# Patient Record
Sex: FEMALE | Race: WHITE | NOT HISPANIC OR LATINO
[De-identification: names, ages, dates, MRNs, and addresses within clinical notes are randomized per-mention and may not be internally consistent; named-entity substitution may affect disease eponyms.]

---

## 2021-03-19 PROBLEM — Z00.00 ENCOUNTER FOR PREVENTIVE HEALTH EXAMINATION: Status: ACTIVE | Noted: 2021-03-19

## 2021-03-22 ENCOUNTER — APPOINTMENT (OUTPATIENT)
Dept: RHEUMATOLOGY | Facility: CLINIC | Age: 36
End: 2021-03-22
Payer: COMMERCIAL

## 2021-03-22 ENCOUNTER — RESULT REVIEW (OUTPATIENT)
Age: 36
End: 2021-03-22

## 2021-03-22 VITALS
TEMPERATURE: 97.9 F | SYSTOLIC BLOOD PRESSURE: 122 MMHG | OXYGEN SATURATION: 98 % | WEIGHT: 185 LBS | BODY MASS INDEX: 29.03 KG/M2 | DIASTOLIC BLOOD PRESSURE: 72 MMHG | HEART RATE: 78 BPM | HEIGHT: 67 IN

## 2021-03-22 DIAGNOSIS — M54.2 CERVICALGIA: ICD-10-CM

## 2021-03-22 DIAGNOSIS — Z82.49 FAMILY HISTORY OF ISCHEMIC HEART DISEASE AND OTHER DISEASES OF THE CIRCULATORY SYSTEM: ICD-10-CM

## 2021-03-22 DIAGNOSIS — Z78.9 OTHER SPECIFIED HEALTH STATUS: ICD-10-CM

## 2021-03-22 DIAGNOSIS — R76.8 OTHER SPECIFIED ABNORMAL IMMUNOLOGICAL FINDINGS IN SERUM: ICD-10-CM

## 2021-03-22 DIAGNOSIS — Z83.3 FAMILY HISTORY OF DIABETES MELLITUS: ICD-10-CM

## 2021-03-22 DIAGNOSIS — M25.50 PAIN IN UNSPECIFIED JOINT: ICD-10-CM

## 2021-03-22 DIAGNOSIS — M54.5 LOW BACK PAIN: ICD-10-CM

## 2021-03-22 DIAGNOSIS — M25.60 STIFFNESS OF UNSPECIFIED JOINT, NOT ELSEWHERE CLASSIFIED: ICD-10-CM

## 2021-03-22 DIAGNOSIS — M25.569 PAIN IN UNSPECIFIED KNEE: ICD-10-CM

## 2021-03-22 PROCEDURE — 99072 ADDL SUPL MATRL&STAF TM PHE: CPT

## 2021-03-22 PROCEDURE — 36415 COLL VENOUS BLD VENIPUNCTURE: CPT

## 2021-03-22 PROCEDURE — 99205 OFFICE O/P NEW HI 60 MIN: CPT | Mod: 25

## 2021-03-22 NOTE — ASSESSMENT
[FreeTextEntry1] : 35-year-old female, here for the first time w/ weak +RF= 29 w/ +TRAN 1:160 nuclear, speckled, 1:80 cytoplasmic w/ reports of joint aches in the b/l hands/PIPs w/ morning stiffness of 2-3 hours daily for past 9 months w/ concern for early RA.\par -reviewed labs from PCP [facility] 9/17/2020 w/ weak +RF= 29 w/ +TRAN 1:160 nuclear, speckled, 1:80 cytoplasmic; normal ESR; low vit D \par -labs as below incld ESR, CRP, serologies, screening labs for DMARDs\par -will consider sulfasalazine or HCQ on f/u \par -Referred for xray of b/l hands to evaluate for structural changes, r/o periarticular osteopenia/RA, r/o erosions\par -Advil PRN w/ food helps w/ pain \par \par Cervicalgia: tenderness in c-spine w/ rotation w/ good ROM w/o paresthesias \par -Referred for xray of c-spine to evaluate for structural changes, DDD\par \par Knee pain -Referred for xray of b/l knees to evaluate for structural changes, OA\par -consider steroid injections \par \par LBP: intermittent \par -Referred for xray of LS spine to evaluate for structural changes, DDD, confirm SI normal \par -Advil PRN w/ food needed sparingly helps\par \par -educated on symptoms to monitor for in detail and alert us if any concerns.\par -knows to stay up to date on health maintenance w/ PCP\par -f/u in 10-14days w/ labs, xrays please\par

## 2021-03-22 NOTE — PHYSICAL EXAM
[General Appearance - Alert] : alert [General Appearance - Well Nourished] : well nourished [Sclera] : the sclera and conjunctiva were normal [Extraocular Movements] : extraocular movements were intact [Outer Ear] : the ears and nose were normal in appearance [Neck Appearance] : the appearance of the neck was normal [Respiration, Rhythm And Depth] : normal respiratory rhythm and effort [Heart Rate And Rhythm] : heart rate was normal and rhythm regular [Heart Sounds] : normal S1 and S2 [Abdomen Soft] : soft [Abdomen Tenderness] : non-tender [Cervical Lymph Nodes Enlarged Anterior Bilaterally] : anterior cervical [Supraclavicular Lymph Nodes Enlarged Bilaterally] : supraclavicular [No CVA Tenderness] : no ~M costovertebral angle tenderness [Motor Tone] : muscle strength and tone were normal [] : no rash [No Focal Deficits] : no focal deficits [Impaired Insight] : insight and judgment were intact [Mood] : the mood was normal [FreeTextEntry1] : tenderness in Rt hand 2-4PIP; tenderness in Lt hand 4th PIP; crepitus in knee w/ Rt>Lt knee tenderness w/o erythema or warmth

## 2021-03-24 LAB
25(OH)D3 SERPL-MCNC: 27.3 NG/ML
ACE BLD-CCNC: 13 U/L
ALBUMIN SERPL ELPH-MCNC: 4.4 G/DL
ALP BLD-CCNC: 70 U/L
ALT SERPL-CCNC: 17 U/L
ANION GAP SERPL CALC-SCNC: 11 MMOL/L
AST SERPL-CCNC: 15 U/L
BASOPHILS # BLD AUTO: 0.03 K/UL
BASOPHILS NFR BLD AUTO: 0.5 %
BILIRUB SERPL-MCNC: <0.2 MG/DL
BUN SERPL-MCNC: 7 MG/DL
C3 SERPL-MCNC: 120 MG/DL
C4 SERPL-MCNC: 34 MG/DL
CALCIUM SERPL-MCNC: 9.6 MG/DL
CCP AB SER IA-ACNC: <8 UNITS
CHLORIDE SERPL-SCNC: 105 MMOL/L
CK SERPL-CCNC: 53 U/L
CO2 SERPL-SCNC: 24 MMOL/L
CREAT SERPL-MCNC: 0.55 MG/DL
CREAT SPEC-SCNC: 142 MG/DL
CREAT/PROT UR: 0.1 RATIO
CRP SERPL-MCNC: <3 MG/L
DSDNA AB SER-ACNC: <12 IU/ML
ENA RNP AB SER IA-ACNC: <0.2 AL
ENA SM AB SER IA-ACNC: <0.2 AL
ENA SS-A AB SER IA-ACNC: <0.2 AL
ENA SS-B AB SER IA-ACNC: <0.2 AL
EOSINOPHIL # BLD AUTO: 0.28 K/UL
EOSINOPHIL NFR BLD AUTO: 4.5 %
ERYTHROCYTE [SEDIMENTATION RATE] IN BLOOD BY WESTERGREN METHOD: 23 MM/HR
GLUCOSE SERPL-MCNC: 93 MG/DL
HAV IGM SER QL: NONREACTIVE
HBV CORE IGM SER QL: NONREACTIVE
HBV SURFACE AG SER QL: NONREACTIVE
HCT VFR BLD CALC: 40.6 %
HCV AB SER QL: NONREACTIVE
HCV S/CO RATIO: 0.09 S/CO
HGB BLD-MCNC: 13.1 G/DL
IMM GRANULOCYTES NFR BLD AUTO: 0.2 %
LYMPHOCYTES # BLD AUTO: 2.08 K/UL
LYMPHOCYTES NFR BLD AUTO: 33.5 %
MAN DIFF?: NORMAL
MCHC RBC-ENTMCNC: 29.5 PG
MCHC RBC-ENTMCNC: 32.3 GM/DL
MCV RBC AUTO: 91.4 FL
MONOCYTES # BLD AUTO: 0.47 K/UL
MONOCYTES NFR BLD AUTO: 7.6 %
NEUTROPHILS # BLD AUTO: 3.33 K/UL
NEUTROPHILS NFR BLD AUTO: 53.7 %
PLATELET # BLD AUTO: 319 K/UL
POTASSIUM SERPL-SCNC: 4 MMOL/L
PROT SERPL-MCNC: 7.1 G/DL
PROT UR-MCNC: 15 MG/DL
RBC # BLD: 4.44 M/UL
RBC # FLD: 13.3 %
RF+CCP IGG SER-IMP: NEGATIVE
RHEUMATOID FACT SER QL: 19 IU/ML
SODIUM SERPL-SCNC: 140 MMOL/L
THYROGLOB AB SERPL-ACNC: <20 IU/ML
THYROPEROXIDASE AB SERPL IA-ACNC: 20.1 IU/ML
TSH SERPL-ACNC: 1.47 UIU/ML
WBC # FLD AUTO: 6.2 K/UL

## 2021-03-26 LAB
ANA PAT FLD IF-IMP: ABNORMAL
ANA SER IF-ACNC: ABNORMAL

## 2021-03-29 ENCOUNTER — APPOINTMENT (OUTPATIENT)
Dept: RADIOLOGY | Facility: CLINIC | Age: 36
End: 2021-03-29
Payer: COMMERCIAL

## 2021-03-29 ENCOUNTER — OUTPATIENT (OUTPATIENT)
Dept: OUTPATIENT SERVICES | Facility: HOSPITAL | Age: 36
LOS: 1 days | End: 2021-03-29
Payer: COMMERCIAL

## 2021-03-29 DIAGNOSIS — M54.2 CERVICALGIA: ICD-10-CM

## 2021-03-29 DIAGNOSIS — M19.049 PRIMARY OSTEOARTHRITIS, UNSPECIFIED HAND: ICD-10-CM

## 2021-03-29 DIAGNOSIS — M25.569 PAIN IN UNSPECIFIED KNEE: ICD-10-CM

## 2021-03-29 LAB
B19V IGG SER QL IA: 7.88 INDEX
B19V IGG+IGM SER-IMP: NORMAL
B19V IGG+IGM SER-IMP: POSITIVE
B19V IGM FLD-ACNC: 0.19 INDEX
B19V IGM SER-ACNC: NEGATIVE
G6PD SER-CCNC: 16 U/G HGB
HLA-B27 RELATED AG QL: NEGATIVE

## 2021-03-29 PROCEDURE — 73120 X-RAY EXAM OF HAND: CPT | Mod: 26,50

## 2021-03-29 PROCEDURE — 72040 X-RAY EXAM NECK SPINE 2-3 VW: CPT | Mod: 26

## 2021-03-29 PROCEDURE — 72110 X-RAY EXAM L-2 SPINE 4/>VWS: CPT | Mod: 26

## 2021-03-29 PROCEDURE — 72110 X-RAY EXAM L-2 SPINE 4/>VWS: CPT

## 2021-03-29 PROCEDURE — 73565 X-RAY EXAM OF KNEES: CPT | Mod: 26

## 2021-03-29 PROCEDURE — 73120 X-RAY EXAM OF HAND: CPT

## 2021-03-29 PROCEDURE — 72040 X-RAY EXAM NECK SPINE 2-3 VW: CPT

## 2021-03-29 PROCEDURE — 73565 X-RAY EXAM OF KNEES: CPT

## 2021-04-28 ENCOUNTER — APPOINTMENT (OUTPATIENT)
Dept: RHEUMATOLOGY | Facility: CLINIC | Age: 36
End: 2021-04-28
Payer: COMMERCIAL

## 2021-04-28 ENCOUNTER — NON-APPOINTMENT (OUTPATIENT)
Age: 36
End: 2021-04-28

## 2021-04-28 VITALS
TEMPERATURE: 98 F | BODY MASS INDEX: 28.98 KG/M2 | DIASTOLIC BLOOD PRESSURE: 72 MMHG | HEART RATE: 100 BPM | WEIGHT: 185 LBS | OXYGEN SATURATION: 97 % | SYSTOLIC BLOOD PRESSURE: 122 MMHG

## 2021-04-28 DIAGNOSIS — M62.830 MUSCLE SPASM OF BACK: ICD-10-CM

## 2021-04-28 DIAGNOSIS — M53.2X2 SPINAL INSTABILITIES, CERVICAL REGION: ICD-10-CM

## 2021-04-28 DIAGNOSIS — M43.10 SPONDYLOLISTHESIS, SITE UNSPECIFIED: ICD-10-CM

## 2021-04-28 DIAGNOSIS — M19.049 PRIMARY OSTEOARTHRITIS, UNSPECIFIED HAND: ICD-10-CM

## 2021-04-28 PROCEDURE — 99072 ADDL SUPL MATRL&STAF TM PHE: CPT

## 2021-04-28 PROCEDURE — 99214 OFFICE O/P EST MOD 30 MIN: CPT

## 2021-04-28 NOTE — ASSESSMENT
[FreeTextEntry1] : 35-year-old female, here for the first follow up w/ +RF= 29 w/ +TRAN 1:160 nuclear, speckled, 1:80 cytoplasmic w/ reports of joint aches in the b/l hands/PIPs w/ morning stiffness of 2-3 hours daily for > 10 months w/ raised ESR w/ concern for early RA.\par -reports daily pain in b/l hands in the PIPs w/ prolonged morning stiffness\par -reviewed labs 3/22/2021 w/ raised ESR= 23; +RF= 19; +TRAN 1:160 speckled; G6PD normal, other serologies normal at this time \par -discussed r/b/s of Plaquenil 200mg PO BID w/ G6 PD normal w/ pt agreeable and prescription sent as below\par -optho referral provided for retinal screening on Plaquenil as discussed\par -labs from PCP [facility] 9/17/2020 w/ weak +RF= 29 w/ +TRAN 1:160 nuclear, speckled, 1:80 cytoplasmic; normal ESR; low vit D \par -reviewed xray of b/l hands 3/29/2021 normal\par -reviewed xray b/l knees 3/29/2021 normal \par -reviewed xray LS spine 3/29/2021 normal; SI normal \par -Advil PRN w/ food helps w/ pain \par \par +TRAN 1:160 speckled: borderline +; likely in the setting of RA\par -Clinically without much symptoms of CTD/lupus/sjogren syndrome at this time and educated on symptoms to monitor for in detail if she evolves.\par -lab as below w/ disease activity markers normal 3/22/2021 w/ DS-DNA normal; C3/C4 WNL; urine prot/creat ratio WNL\par -thyroid abs negative as discussed +TRAN can be seen with cross reactivity\par \par Cervicalgia: tenderness in c-spine w/ rotation w/ good ROM w/o paresthesias \par -Reviewed xray c-spine 3/29/2021 w/ retrolisthesis w/ degree of dynamic instability; straightened lordosis\par -referred for MRI c-spine to r/o ligament injury; confirm no RA pannus for neck pain \par paraspinal tightness/spams: -discussed r/b/s of cyclobenzaprine PRN w/ pt agreeable and prescription sent as below \par \par Hypovitaminosis D: low vitD w/ prescription for vitD repletion sent as discussed.\par \par -educated on symptoms to monitor for in detail and alert us if any concerns.\par -knows to stay up to date on health maintenance w/ PCP\par -f/u in 6-8 weeks w/ labs and MRI c-spine or sooner as needed \par

## 2021-04-28 NOTE — REVIEW OF SYSTEMS
[As Noted in HPI] : as noted in HPI [Fever] : no fever [Chills] : no chills [Eye Pain] : no eye pain [Red Eyes] : eyes not red [Nosebleeds] : no nosebleeds [Chest Pain] : no chest pain [Shortness Of Breath] : no shortness of breath [Abdominal Pain] : no abdominal pain [Skin Lesions] : no skin lesions [Confused] : no confusion [Suicidal] : not suicidal [Proptosis] : no proptosis [Easy Bleeding] : no tendency for easy bleeding

## 2021-04-28 NOTE — HISTORY OF PRESENT ILLNESS
[FreeTextEntry1] : 35-year-old female here for the first follow to review labs/xrays. Patient was found to have positive rheumatoid factor as well as positive TRAN with her primary care.\par Patient states that she does notice achy joint aches since around summer, July of 2020. Patient states she can notice daily joint aches in her bilateral hands specially around the PIPs.\par States morning stiffness in the hands is about 2-3 hours. States she thinks there can be some swelling w/ the pain.\par States taking Advil helps the pain.\par States she notices intermittent soreness in her elbows at times.\par States she notices some intermittent right more than left knee pain with stiffness. Denies any crystal arthropathy like attacks.\par States she did notices some soreness in her neck with rotation without paresthesias.\par States she notices lower back pain worse with standing; better with stretching. Denies any loss of bladder or bowel incontinence or saddle anesthesias.\par Denies any pain or swelling in her ankles or her toes at this time.\par Denies any fever/chills, no rashes, no nasal or oral ulcers, no dry eyes, no dry mouth, no raynaud's, no infectious diarrhea or  symptoms at this time. States she has hx of UTI in the past and now now.\par Denies any hx of blood clots, no stroke, no pregnancies, no miscarriages. \par \par

## 2021-06-16 LAB
ALBUMIN SERPL ELPH-MCNC: 4.4 G/DL
ALP BLD-CCNC: 65 U/L
ALT SERPL-CCNC: 14 U/L
ANION GAP SERPL CALC-SCNC: 12 MMOL/L
AST SERPL-CCNC: 13 U/L
BASOPHILS # BLD AUTO: 0.03 K/UL
BASOPHILS NFR BLD AUTO: 0.5 %
BILIRUB SERPL-MCNC: 0.4 MG/DL
BUN SERPL-MCNC: 9 MG/DL
CALCIUM SERPL-MCNC: 9.7 MG/DL
CHLORIDE SERPL-SCNC: 102 MMOL/L
CO2 SERPL-SCNC: 25 MMOL/L
CREAT SERPL-MCNC: 0.63 MG/DL
CREAT SPEC-SCNC: 245 MG/DL
CREAT/PROT UR: 0.1 RATIO
CRP SERPL-MCNC: <3 MG/L
EOSINOPHIL # BLD AUTO: 0.21 K/UL
EOSINOPHIL NFR BLD AUTO: 3.3 %
GLUCOSE SERPL-MCNC: 166 MG/DL
HCT VFR BLD CALC: 39.3 %
HGB BLD-MCNC: 13.1 G/DL
IMM GRANULOCYTES NFR BLD AUTO: 0.2 %
LYMPHOCYTES # BLD AUTO: 1.76 K/UL
LYMPHOCYTES NFR BLD AUTO: 27.3 %
MAN DIFF?: NORMAL
MCHC RBC-ENTMCNC: 30 PG
MCHC RBC-ENTMCNC: 33.3 GM/DL
MCV RBC AUTO: 90.1 FL
MONOCYTES # BLD AUTO: 0.4 K/UL
MONOCYTES NFR BLD AUTO: 6.2 %
NEUTROPHILS # BLD AUTO: 4.04 K/UL
NEUTROPHILS NFR BLD AUTO: 62.5 %
PLATELET # BLD AUTO: 308 K/UL
POTASSIUM SERPL-SCNC: 4 MMOL/L
PROT SERPL-MCNC: 7.3 G/DL
PROT UR-MCNC: 14 MG/DL
RBC # BLD: 4.36 M/UL
RBC # FLD: 13.3 %
SODIUM SERPL-SCNC: 138 MMOL/L
WBC # FLD AUTO: 6.45 K/UL

## 2021-06-17 ENCOUNTER — OUTPATIENT (OUTPATIENT)
Dept: OUTPATIENT SERVICES | Facility: HOSPITAL | Age: 36
LOS: 1 days | End: 2021-06-17
Payer: COMMERCIAL

## 2021-06-17 ENCOUNTER — APPOINTMENT (OUTPATIENT)
Dept: MRI IMAGING | Facility: CLINIC | Age: 36
End: 2021-06-17
Payer: COMMERCIAL

## 2021-06-17 DIAGNOSIS — Z00.8 ENCOUNTER FOR OTHER GENERAL EXAMINATION: ICD-10-CM

## 2021-06-17 DIAGNOSIS — M53.2X2 SPINAL INSTABILITIES, CERVICAL REGION: ICD-10-CM

## 2021-06-17 LAB
ANA PAT FLD IF-IMP: ABNORMAL
ANA SER IF-ACNC: ABNORMAL
C3 SERPL-MCNC: 114 MG/DL
C4 SERPL-MCNC: 29 MG/DL
ERYTHROCYTE [SEDIMENTATION RATE] IN BLOOD BY WESTERGREN METHOD: 32 MM/HR

## 2021-06-17 PROCEDURE — 72156 MRI NECK SPINE W/O & W/DYE: CPT | Mod: 26

## 2021-06-17 PROCEDURE — 72156 MRI NECK SPINE W/O & W/DYE: CPT

## 2021-06-17 PROCEDURE — A9585: CPT

## 2021-06-21 LAB — DSDNA AB SER-ACNC: <12 IU/ML

## 2021-06-24 ENCOUNTER — APPOINTMENT (OUTPATIENT)
Dept: RHEUMATOLOGY | Facility: CLINIC | Age: 36
End: 2021-06-24
Payer: COMMERCIAL

## 2021-06-24 VITALS
TEMPERATURE: 98 F | WEIGHT: 185 LBS | OXYGEN SATURATION: 98 % | SYSTOLIC BLOOD PRESSURE: 100 MMHG | HEART RATE: 74 BPM | BODY MASS INDEX: 29.03 KG/M2 | HEIGHT: 67 IN | DIASTOLIC BLOOD PRESSURE: 60 MMHG

## 2021-06-24 PROCEDURE — 99214 OFFICE O/P EST MOD 30 MIN: CPT

## 2021-06-24 PROCEDURE — 99072 ADDL SUPL MATRL&STAF TM PHE: CPT

## 2021-06-24 RX ORDER — ERGOCALCIFEROL 1.25 MG/1
1.25 MG CAPSULE, LIQUID FILLED ORAL
Qty: 4 | Refills: 1 | Status: DISCONTINUED | COMMUNITY
Start: 2021-04-28 | End: 2021-06-24

## 2021-06-24 NOTE — ASSESSMENT
[FreeTextEntry1] : 35-year-old female, here for follow up w/ +RF= 29 w/ +TRAN 1:640 homo; 1:160 nuclear, speckled, 1:80 cytoplasmic w/ reports of joint aches in the b/l hands/PIPs w/ morning stiffness of 2-3 hours daily w/ raised ESR w/ concern for RA.\par -reports improved pain in the b/l hands on HCQ and notes intermittent stiffness in the PIPs at times\par -discussed will get Vectra for f/u to evaluate disease activity and if not low will add on DMARDs then\par -reviewed labs 6/16/2021 w/ normal CRP; raised ESR= 32; +TRAN 1:640 homo; DS-DNA neg; C3/C4 WNL; urine prot/creat ratio=0.1\par -refill Plaquenil 200mg PO BID w/ G6PD normal w/ pt agreeable and prescription sent as below\par -optho referral provided for retinal screening on Plaquenil as discussed again \par -labs as below to monitor for f/u \par -xray of b/l hands 3/29/2021 normal\par -xray b/l knees 3/29/2021 normal \par -xray LS spine 3/29/2021 normal; SI normal \par -Advil PRN w/ food helps w/ pain \par \par +TRAN 1:640 homogenous; likely in the setting of RA\par -Clinically without much symptoms of CTD/lupus/sjogren syndrome at this time and educated on symptoms to monitor for in detail if she evolves.\par -lab as below w/ disease activity markers normal 6/16/2021 w/ DS-DNA normal; C3/C4 WNL; urine prot/creat ratio WNL\par -thyroid abs negative \par \par Cervicalgia: tenderness in c-spine w/ rotation w/ good ROM w/o paresthesias \par -reviewed MRI c-spine 6/17/2021 minimal cervical spondylosis \par -xray c-spine 3/29/2021 w/ retrolisthesis w/ degree of dynamic instability; straightened lordosis\par \par -educated on symptoms to monitor for in detail and alert us if any concerns.\par -knows to stay up to date on health maintenance w/ PCP\par -f/u in 10-12 weeks w/ labs, Vectra or sooner as needed \par . \par \par

## 2021-06-24 NOTE — PHYSICAL EXAM
[General Appearance - Alert] : alert [General Appearance - In No Acute Distress] : in no acute distress [Sclera] : the sclera and conjunctiva were normal [Extraocular Movements] : extraocular movements were intact [Outer Ear] : the ears and nose were normal in appearance [Neck Appearance] : the appearance of the neck was normal [Respiration, Rhythm And Depth] : normal respiratory rhythm and effort [Heart Rate And Rhythm] : heart rate was normal and rhythm regular [Heart Sounds] : normal S1 and S2 [Abdomen Soft] : soft [Abdomen Tenderness] : non-tender [Cervical Lymph Nodes Enlarged Anterior Bilaterally] : anterior cervical [Supraclavicular Lymph Nodes Enlarged Bilaterally] : supraclavicular [No CVA Tenderness] : no ~M costovertebral angle tenderness [Motor Tone] : muscle strength and tone were normal [] : no rash [No Focal Deficits] : no focal deficits [Impaired Insight] : insight and judgment were intact [Mood] : the mood was normal [FreeTextEntry1] : no synovitis or effusion on exam noted today; good ROM in b/l shoulders

## 2021-06-24 NOTE — HISTORY OF PRESENT ILLNESS
[FreeTextEntry1] : 35-year-old female, here for follow up w/ +RF= 29 w/ +TRAN 1:640 homo; 1:160 nuclear, speckled, 1:80 cytoplasmic w/ reports of joint aches in the b/l hands/PIPs w/ morning stiffness of 2-3 hours daily w/ raised ESR w/ concern for RA.\par \par Patient states her joints aches today on the HCQ are much improved. States she still has to see Optho for eye exam on HCQ and will go soon. States no swelling or warmth of the joints today. States she notes intermittent stiffness in the hands around the PIPs for about 30 mins and monitoring for any swelling. \par States she did notices some soreness in her neck with rotation without paresthesias & did MRI to review.\par Denies any pain or swelling in her ankles or her toes at this time.\par Denies any fever/chills, no rashes, no nasal or oral ulcers, no dry eyes, no dry mouth, no raynaud's, no infectious diarrhea or  symptoms at this time. States she has hx of UTI in the past and now now.\par Denies any hx of blood clots, no stroke, no pregnancies, no miscarriages. \par \par \par

## 2021-09-23 ENCOUNTER — LABORATORY RESULT (OUTPATIENT)
Age: 36
End: 2021-09-23

## 2021-09-27 ENCOUNTER — LABORATORY RESULT (OUTPATIENT)
Age: 36
End: 2021-09-27

## 2021-09-27 ENCOUNTER — NON-APPOINTMENT (OUTPATIENT)
Age: 36
End: 2021-09-27

## 2021-09-27 ENCOUNTER — APPOINTMENT (OUTPATIENT)
Dept: FAMILY MEDICINE | Facility: CLINIC | Age: 36
End: 2021-09-27
Payer: COMMERCIAL

## 2021-09-27 VITALS
HEIGHT: 67 IN | SYSTOLIC BLOOD PRESSURE: 110 MMHG | WEIGHT: 199 LBS | HEART RATE: 81 BPM | OXYGEN SATURATION: 98 % | DIASTOLIC BLOOD PRESSURE: 62 MMHG | TEMPERATURE: 96.4 F | BODY MASS INDEX: 31.23 KG/M2

## 2021-09-27 DIAGNOSIS — Z13.6 ENCOUNTER FOR SCREENING FOR CARDIOVASCULAR DISORDERS: ICD-10-CM

## 2021-09-27 DIAGNOSIS — J45.909 UNSPECIFIED ASTHMA, UNCOMPLICATED: ICD-10-CM

## 2021-09-27 DIAGNOSIS — Z00.00 ENCOUNTER FOR GENERAL ADULT MEDICAL EXAMINATION W/OUT ABNORMAL FINDINGS: ICD-10-CM

## 2021-09-27 LAB
25(OH)D3 SERPL-MCNC: 24.1 NG/ML
ALBUMIN SERPL ELPH-MCNC: 4.6 G/DL
ALP BLD-CCNC: 63 U/L
ALT SERPL-CCNC: 13 U/L
ANA PAT FLD IF-IMP: ABNORMAL
ANA SER IF-ACNC: ABNORMAL
ANION GAP SERPL CALC-SCNC: 12 MMOL/L
AST SERPL-CCNC: 15 U/L
BASOPHILS # BLD AUTO: 0.02 K/UL
BASOPHILS NFR BLD AUTO: 0.4 %
BILIRUB SERPL-MCNC: 0.3 MG/DL
BUN SERPL-MCNC: 10 MG/DL
C3 SERPL-MCNC: 130 MG/DL
C4 SERPL-MCNC: 30 MG/DL
CALCIUM SERPL-MCNC: 9.4 MG/DL
CHLORIDE SERPL-SCNC: 103 MMOL/L
CO2 SERPL-SCNC: 23 MMOL/L
CREAT SERPL-MCNC: 0.61 MG/DL
CREAT SPEC-SCNC: 55 MG/DL
CREAT/PROT UR: 0.2 RATIO
CRP SERPL-MCNC: <3 MG/L
DSDNA AB SER-ACNC: <12 IU/ML
EOSINOPHIL # BLD AUTO: 0.15 K/UL
EOSINOPHIL NFR BLD AUTO: 2.8 %
ERYTHROCYTE [SEDIMENTATION RATE] IN BLOOD BY WESTERGREN METHOD: 27 MM/HR
GLUCOSE SERPL-MCNC: 108 MG/DL
HCT VFR BLD CALC: 43.7 %
HGB BLD-MCNC: 13.9 G/DL
IMM GRANULOCYTES NFR BLD AUTO: 0.2 %
LYMPHOCYTES # BLD AUTO: 1.82 K/UL
LYMPHOCYTES NFR BLD AUTO: 34.1 %
MAN DIFF?: NORMAL
MCHC RBC-ENTMCNC: 29.3 PG
MCHC RBC-ENTMCNC: 31.8 GM/DL
MCV RBC AUTO: 92 FL
MONOCYTES # BLD AUTO: 0.34 K/UL
MONOCYTES NFR BLD AUTO: 6.4 %
NEUTROPHILS # BLD AUTO: 3 K/UL
NEUTROPHILS NFR BLD AUTO: 56.1 %
PLATELET # BLD AUTO: 305 K/UL
POTASSIUM SERPL-SCNC: 4.3 MMOL/L
PROT SERPL-MCNC: 7.4 G/DL
PROT UR-MCNC: 11 MG/DL
RBC # BLD: 4.75 M/UL
RBC # FLD: 13.9 %
SODIUM SERPL-SCNC: 138 MMOL/L
WBC # FLD AUTO: 5.34 K/UL

## 2021-09-27 PROCEDURE — 36415 COLL VENOUS BLD VENIPUNCTURE: CPT

## 2021-09-27 PROCEDURE — 99395 PREV VISIT EST AGE 18-39: CPT | Mod: 25

## 2021-09-27 PROCEDURE — 93000 ELECTROCARDIOGRAM COMPLETE: CPT

## 2021-09-27 RX ORDER — ALBUTEROL SULFATE 90 UG/1
108 (90 BASE) INHALANT RESPIRATORY (INHALATION)
Qty: 1 | Refills: 6 | Status: ACTIVE | COMMUNITY
Start: 1900-01-01 | End: 1900-01-01

## 2021-09-27 NOTE — PLAN
[FreeTextEntry1] : Fasting labs were performed today . ECG reviewed and WNL \par Advised to undergo any preventative testing that is overdue . \par Patient will continue healthy eating and exercise and followup in one year for PE\par

## 2021-09-27 NOTE — HISTORY OF PRESENT ILLNESS
[de-identified] : Patient is here for yearly physical. She was recently diagnosed with RA after evaluation at Rheumatology , she currently is feeling improved since starting Plaquenil  . She is UTD with all preventative testing , dentist, Optho and Derm .\par Patient is eating healthy  and exercising . Patient is Not Covid Vaccinated. She was diagnosed with Covid 4/2021 and is requesting antibody test today \par Patient is fasting for physical labs today\par

## 2021-09-27 NOTE — HEALTH RISK ASSESSMENT
[Very Good] : ~his/her~  mood as very good [Yes] : Yes [Monthly or less (1 pt)] : Monthly or less (1 point) [1 or 2 (0 pts)] : 1 or 2 (0 points) [Never (0 pts)] : Never (0 points) [0] : 2) Feeling down, depressed, or hopeless: Not at all (0) [Patient reported PAP Smear was normal] : Patient reported PAP Smear was normal [Alone] : lives alone [Employed] : employed [College] : College [Single] : single [Reports normal functional visual acuity (ie: able to read med bottle)] : Reports normal functional visual acuity [Smoke Detector] : smoke detector [Carbon Monoxide Detector] : carbon monoxide detector [Safety elements used in home] : safety elements used in home [Seat Belt] :  uses seat belt [Sunscreen] : uses sunscreen [Feels Safe at Home] : Feels safe at home [] : No [de-identified] : walking [de-identified] : moderately healthy  [Sexually Active] : not sexually active [Reports changes in hearing] : Reports no changes in hearing [Reports changes in vision] : Reports no changes in vision [Reports changes in dental health] : Reports no changes in dental health [PapSmearDate] : 2019  [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

## 2021-09-28 LAB
CHOLEST SERPL-MCNC: 191 MG/DL
COVID-19 NUCLEOCAPSID  GAM ANTIBODY INTERPRETATION: POSITIVE
ESTIMATED AVERAGE GLUCOSE: 108 MG/DL
HBA1C MFR BLD HPLC: 5.4 %
HDLC SERPL-MCNC: 42 MG/DL
LDLC SERPL CALC-MCNC: 135 MG/DL
NONHDLC SERPL-MCNC: 149 MG/DL
SARS-COV-2 AB SERPL QL IA: 229 INDEX
T3RU NFR SERPL: 1.1 TBI
TRIGL SERPL-MCNC: 66 MG/DL
TSH SERPL-ACNC: 2.01 UIU/ML

## 2021-09-29 ENCOUNTER — NON-APPOINTMENT (OUTPATIENT)
Age: 36
End: 2021-09-29

## 2021-10-04 ENCOUNTER — APPOINTMENT (OUTPATIENT)
Dept: RHEUMATOLOGY | Facility: CLINIC | Age: 36
End: 2021-10-04
Payer: COMMERCIAL

## 2021-10-04 VITALS
DIASTOLIC BLOOD PRESSURE: 72 MMHG | TEMPERATURE: 96.7 F | BODY MASS INDEX: 29.82 KG/M2 | HEIGHT: 67 IN | WEIGHT: 190 LBS | OXYGEN SATURATION: 98 % | HEART RATE: 80 BPM | SYSTOLIC BLOOD PRESSURE: 103 MMHG

## 2021-10-04 DIAGNOSIS — Z78.9 OTHER SPECIFIED HEALTH STATUS: ICD-10-CM

## 2021-10-04 PROCEDURE — 99214 OFFICE O/P EST MOD 30 MIN: CPT

## 2021-10-04 NOTE — ASSESSMENT
[FreeTextEntry1] : 36-year-old female, here for follow up w/ +RF= 29 w/ +TRAN 1:1280 speckled; 1:640 homo;1:80 cytoplasmic w/ reports of joint aches in the b/l hands/PIPs w/ morning stiffness of 2-3 hours daily w/ raised ESR consistent w/ RA.\par -reports improved pain in the b/l hands on HCQ without synovitis now \par -reviewed labs 9/23/2021 w/ normal CRP; mildly raised ESR= 27; +TRAN 1:1280 speckled; DS-DNA neg; C3/C4 WNL; urine prot/creat ratio=0.1; Vectra pending \par -refill Plaquenil 200mg PO BID w/ G6PD normal w/ pt agreeable and prescription sent as below\par -reports seeing Optho, Dr. Parra w/ [facility] tomorrow for eye exam on HCQ and will alert us if any concerns\par -labs as below to monitor for f/u \par -xray of b/l hands 3/29/2021 normal\par -xray b/l knees 3/29/2021 normal \par -xray LS spine 3/29/2021 normal; SI normal \par -Advil PRN w/ food helps w/ pain \par \par +TRAN 1:1280 speckled: prior TRAN 1:640 homogenous; likely in the setting of RA\par -Clinically without much symptoms of CTD/lupus/sjogren syndrome at this time and educated on symptoms to monitor for in detail if she evolves.\par -lab as below w/ disease activity markers normal 9/23/2021 w/ +TRAN 1:1280 speckled; DS-DNA normal; C3/C4 WNL; urine prot/creat ratio WNL\par -thyroid abs negative \par \par Cervicalgia: tenderness in c-spine w/ rotation w/ good ROM w/o paresthesias \par -MRI c-spine 6/17/2021 minimal cervical spondylosis \par -xray c-spine 3/29/2021 w/ retrolisthesis w/ degree of dynamic instability; straightened lordosis\par \par Hypovitaminosis D: low vitD w/ prescription for vitD repletion sent as discussed.\par \par -educated on symptoms to monitor for in detail and alert us if any concerns.\par -knows to stay up to date on health maintenance w/ PCP\par -f/u in 3-4 mo w/ labs or sooner as needed \par . \par \par  \par

## 2021-10-04 NOTE — HISTORY OF PRESENT ILLNESS
[FreeTextEntry1] : 36-year-old female, here for follow up w/ +RF= 29 w/ +TRAN 1:640 homo; 1:160 nuclear, speckled, 1:80 cytoplasmic w/ reports of joint aches in the b/l hands/PIPs w/ morning stiffness of 2-3 hours daily w/ raised ESR w/ concern for RA.\par \par Patient states her joints aches today on the HCQ are much improved. States she still has to see Optho for eye exam on HCQ and reports scheduled for tomorrow w/ Dr. Parra at [facility] and will alert us if any concerns. States no swelling or warmth of the joints today. \par States she notes intermittent stiffness in the hands around the PIPs for about 15 mins.\par States not much neck pain lately. \par Denies any pain or swelling in her ankles or her toes at this time.\par Denies any fever/chills, no rashes, no nasal or oral ulcers, no dry eyes, no dry mouth, no raynaud's, no infectious diarrhea or  symptoms at this time. States she has hx of UTI in the past and now now.\par Denies any hx of blood clots, no stroke, no pregnancies, no miscarriages. \par \par \par

## 2021-10-04 NOTE — PHYSICAL EXAM
[Impaired Insight] : insight and judgment were intact [Mood] : the mood was normal [General Appearance - Alert] : alert [General Appearance - In No Acute Distress] : in no acute distress [Sclera] : the sclera and conjunctiva were normal [Extraocular Movements] : extraocular movements were intact [Outer Ear] : the ears and nose were normal in appearance [Neck Appearance] : the appearance of the neck was normal [Respiration, Rhythm And Depth] : normal respiratory rhythm and effort [Heart Rate And Rhythm] : heart rate was normal and rhythm regular [Heart Sounds] : normal S1 and S2 [Abdomen Soft] : soft [Abdomen Tenderness] : non-tender [Cervical Lymph Nodes Enlarged Anterior Bilaterally] : anterior cervical [Supraclavicular Lymph Nodes Enlarged Bilaterally] : supraclavicular [No CVA Tenderness] : no ~M costovertebral angle tenderness [Motor Tone] : muscle strength and tone were normal [] : no rash [No Focal Deficits] : no focal deficits [FreeTextEntry1] : no synovitis or effusion on exam noted today

## 2021-10-04 NOTE — REASON FOR VISIT
[Follow-Up: _____] : a [unfilled] follow-up visit [FreeTextEntry1] : RA; review labs/meds; +TRAN \par Denies any hx of blood clots, no stroke, no pregnancies, no miscarriages. \par \par \par

## 2021-10-05 ENCOUNTER — APPOINTMENT (OUTPATIENT)
Dept: OPHTHALMOLOGY | Facility: CLINIC | Age: 36
End: 2021-10-05
Payer: COMMERCIAL

## 2021-10-05 ENCOUNTER — NON-APPOINTMENT (OUTPATIENT)
Age: 36
End: 2021-10-05

## 2021-10-05 PROCEDURE — 92004 COMPRE OPH EXAM NEW PT 1/>: CPT

## 2021-10-05 PROCEDURE — 92083 EXTENDED VISUAL FIELD XM: CPT

## 2021-10-05 PROCEDURE — 92134 CPTRZ OPH DX IMG PST SGM RTA: CPT

## 2021-12-31 LAB
25(OH)D3 SERPL-MCNC: 25.9 NG/ML
ALBUMIN SERPL ELPH-MCNC: 4.4 G/DL
ALP BLD-CCNC: 60 U/L
ALT SERPL-CCNC: 13 U/L
ANION GAP SERPL CALC-SCNC: 12 MMOL/L
AST SERPL-CCNC: 15 U/L
BASOPHILS # BLD AUTO: 0.03 K/UL
BASOPHILS NFR BLD AUTO: 0.5 %
BILIRUB SERPL-MCNC: 0.3 MG/DL
BUN SERPL-MCNC: 10 MG/DL
CALCIUM SERPL-MCNC: 9.1 MG/DL
CHLORIDE SERPL-SCNC: 106 MMOL/L
CO2 SERPL-SCNC: 21 MMOL/L
CREAT SERPL-MCNC: 0.6 MG/DL
CREAT SPEC-SCNC: 73 MG/DL
CREAT/PROT UR: 0.1 RATIO
CRP SERPL-MCNC: <3 MG/L
EOSINOPHIL # BLD AUTO: 0.2 K/UL
EOSINOPHIL NFR BLD AUTO: 3.2 %
ERYTHROCYTE [SEDIMENTATION RATE] IN BLOOD BY WESTERGREN METHOD: 26 MM/HR
GLUCOSE SERPL-MCNC: 118 MG/DL
HCT VFR BLD CALC: 40.7 %
HGB BLD-MCNC: 13.4 G/DL
IMM GRANULOCYTES NFR BLD AUTO: 0.3 %
LYMPHOCYTES # BLD AUTO: 2 K/UL
LYMPHOCYTES NFR BLD AUTO: 31.7 %
MAN DIFF?: NORMAL
MCHC RBC-ENTMCNC: 30 PG
MCHC RBC-ENTMCNC: 32.9 GM/DL
MCV RBC AUTO: 91.1 FL
MONOCYTES # BLD AUTO: 0.42 K/UL
MONOCYTES NFR BLD AUTO: 6.7 %
NEUTROPHILS # BLD AUTO: 3.63 K/UL
NEUTROPHILS NFR BLD AUTO: 57.6 %
PLATELET # BLD AUTO: 295 K/UL
POTASSIUM SERPL-SCNC: 4.2 MMOL/L
PROT SERPL-MCNC: 7 G/DL
PROT UR-MCNC: 5 MG/DL
RBC # BLD: 4.47 M/UL
RBC # FLD: 13.5 %
SODIUM SERPL-SCNC: 139 MMOL/L
WBC # FLD AUTO: 6.3 K/UL

## 2022-01-03 LAB
ANA PAT FLD IF-IMP: ABNORMAL
ANA SER IF-ACNC: ABNORMAL
DSDNA AB SER-ACNC: <12 IU/ML

## 2022-01-05 LAB
C3 SERPL-MCNC: 169 MG/DL
C4 SERPL-MCNC: 34 MG/DL

## 2022-01-06 ENCOUNTER — APPOINTMENT (OUTPATIENT)
Dept: RHEUMATOLOGY | Facility: CLINIC | Age: 37
End: 2022-01-06
Payer: COMMERCIAL

## 2022-01-06 VITALS
SYSTOLIC BLOOD PRESSURE: 122 MMHG | DIASTOLIC BLOOD PRESSURE: 72 MMHG | WEIGHT: 190 LBS | TEMPERATURE: 97.9 F | BODY MASS INDEX: 29.76 KG/M2 | OXYGEN SATURATION: 98 % | HEART RATE: 68 BPM

## 2022-01-06 DIAGNOSIS — E55.9 VITAMIN D DEFICIENCY, UNSPECIFIED: ICD-10-CM

## 2022-01-06 PROCEDURE — 99214 OFFICE O/P EST MOD 30 MIN: CPT

## 2022-01-06 RX ORDER — CYCLOBENZAPRINE HYDROCHLORIDE 5 MG/1
5 TABLET, FILM COATED ORAL
Qty: 60 | Refills: 1 | Status: DISCONTINUED | COMMUNITY
Start: 2021-04-28 | End: 2022-01-06

## 2022-01-06 NOTE — ASSESSMENT
[FreeTextEntry1] : 36-year-old female, here for follow up w/ +RF= 29 w/ +TRAN 1:1280 speckled; 1:640 homo;1:80 cytoplasmic w/ reports of joint aches in the b/l hands/PIPs w/ morning stiffness of 2-3 hours daily w/ raised ESR w/ Vectra =41 w/ moderate disease activity consistent w/ RA.\par -reports improved pain in the b/l hands on HCQ without synovitis now \par -reviewed labs 12/30/2021 w/ normal CRP; mildly raised decreased ESR= 26 (from 27); +TRAN 1:640 speckled; DS-DNA neg; C3/C4 WNL; urine prot/creat ratio=0.1\par -refill Plaquenil 200mg PO BID w/ G6PD normal w/ pt agreeable and prescription sent as below\par -saw Optho, Dr. Parra w/ [facility] 10/5/21 w/ eye exam normal on HCQ to f/u in 1 yr \par -labs as below to monitor for f/u \par -xray of b/l hands 3/29/2021 normal\par -xray b/l knees 3/29/2021 normal \par -xray LS spine 3/29/2021 normal; SI normal \par -Advil PRN w/ food helps w/ pain \par \par +TRAN 1:1280 speckled: prior TRAN 1:640 speckled/ homo; likely in the setting of RA\par -Clinically without much symptoms of CTD/lupus/sjogren syndrome at this time and educated on symptoms to monitor for in detail if she evolves.\par -lab as below w/ disease activity markers normal 12/30/2021 w/ +TRAN 1:640 speckled; DS-DNA normal; C3/C4 WNL; urine prot/creat ratio=0.1\par -thyroid abs negative \par \par Cervicalgia: intermittent; not much today \par -MRI c-spine 6/17/2021 minimal cervical spondylosis \par -xray c-spine 3/29/2021 w/ retrolisthesis w/ degree of dynamic instability; straightened lordosis\par \par Hypovitaminosis D: low vitD w/ prescription for vitD repletion sent as discussed.\par \par -educated on symptoms to monitor for in detail and alert us if any concerns.\par -knows to stay up to date on health maintenance w/ PCP\par -f/u in 3 mo w/ labs or sooner as needed \par

## 2022-01-06 NOTE — HISTORY OF PRESENT ILLNESS
[FreeTextEntry1] : 36-year-old female, here for follow up w/ +RF= 29 w/ +TRAN 1:640 homo; 1:160 nuclear, speckled, 1:80 cytoplasmic w/ reports of joint aches in the b/l hands/PIPs w/ morning stiffness of 2-3 hours daily w/ raised ESR w/ concern for RA.\par \par Patient states her joints aches today on the HCQ are much improved. States she still has to saw Optho, Dr. Parra 10/2021 w/ eye exam normal on HCQ and told to f/u in 1 yr. States no swelling or warmth of the joints today. \par States she notes intermittent stiffness in the hands around the PIPs for about 15 mins.\par States not much neck pain lately. \par Denies any pain or swelling in her ankles or her toes at this time.\par Denies any fever/chills, no rashes, no nasal or oral ulcers, no dry eyes, no dry mouth, no raynaud's, no infectious diarrhea or  symptoms at this time. States she has hx of UTI in the past and now now.\par Denies any hx of blood clots, no stroke, no pregnancies, no miscarriages. \par \par \par

## 2022-01-06 NOTE — REASON FOR VISIT
[Follow-Up: _____] : a [unfilled] follow-up visit [FreeTextEntry1] : RA; review labs/meds; +TRAN              \par Denies any hx of blood clots, no stroke, no pregnancies, no miscarriages. \par

## 2022-04-28 LAB
25(OH)D3 SERPL-MCNC: 33.1 NG/ML
ALBUMIN SERPL ELPH-MCNC: 4.6 G/DL
ALP BLD-CCNC: 64 U/L
ALT SERPL-CCNC: 16 U/L
ANION GAP SERPL CALC-SCNC: 12 MMOL/L
AST SERPL-CCNC: 15 U/L
BASOPHILS # BLD AUTO: 0.02 K/UL
BASOPHILS NFR BLD AUTO: 0.3 %
BILIRUB SERPL-MCNC: 0.4 MG/DL
BUN SERPL-MCNC: 9 MG/DL
CALCIUM SERPL-MCNC: 9.5 MG/DL
CHLORIDE SERPL-SCNC: 101 MMOL/L
CO2 SERPL-SCNC: 22 MMOL/L
CREAT SERPL-MCNC: 0.61 MG/DL
CRP SERPL-MCNC: <3 MG/L
EGFR: 120 ML/MIN/1.73M2
EOSINOPHIL # BLD AUTO: 0.11 K/UL
EOSINOPHIL NFR BLD AUTO: 1.7 %
ERYTHROCYTE [SEDIMENTATION RATE] IN BLOOD BY WESTERGREN METHOD: 22 MM/HR
GLUCOSE SERPL-MCNC: 109 MG/DL
HCT VFR BLD CALC: 43 %
HGB BLD-MCNC: 13.9 G/DL
IMM GRANULOCYTES NFR BLD AUTO: 0.2 %
LYMPHOCYTES # BLD AUTO: 1.88 K/UL
LYMPHOCYTES NFR BLD AUTO: 28.8 %
MAN DIFF?: NORMAL
MCHC RBC-ENTMCNC: 29.1 PG
MCHC RBC-ENTMCNC: 32.3 GM/DL
MCV RBC AUTO: 90.1 FL
MONOCYTES # BLD AUTO: 0.49 K/UL
MONOCYTES NFR BLD AUTO: 7.5 %
NEUTROPHILS # BLD AUTO: 4.02 K/UL
NEUTROPHILS NFR BLD AUTO: 61.5 %
PLATELET # BLD AUTO: 319 K/UL
POTASSIUM SERPL-SCNC: 4.4 MMOL/L
PROT SERPL-MCNC: 7.6 G/DL
RBC # BLD: 4.77 M/UL
RBC # FLD: 13.7 %
SODIUM SERPL-SCNC: 136 MMOL/L
WBC # FLD AUTO: 6.53 K/UL

## 2022-05-04 ENCOUNTER — APPOINTMENT (OUTPATIENT)
Dept: RHEUMATOLOGY | Facility: CLINIC | Age: 37
End: 2022-05-04
Payer: COMMERCIAL

## 2022-05-04 VITALS
BODY MASS INDEX: 30.38 KG/M2 | TEMPERATURE: 97.1 F | DIASTOLIC BLOOD PRESSURE: 70 MMHG | OXYGEN SATURATION: 98 % | SYSTOLIC BLOOD PRESSURE: 122 MMHG | WEIGHT: 194 LBS | HEART RATE: 76 BPM

## 2022-05-04 DIAGNOSIS — M05.741 RHEUMATOID ARTHRITIS WITH RHEUMATOID FACTOR OF RIGHT HAND W/OUT ORGAN OR SYSTEMS INVOLVEMENT: ICD-10-CM

## 2022-05-04 DIAGNOSIS — M05.742 RHEUMATOID ARTHRITIS WITH RHEUMATOID FACTOR OF RIGHT HAND W/OUT ORGAN OR SYSTEMS INVOLVEMENT: ICD-10-CM

## 2022-05-04 DIAGNOSIS — R76.8 OTHER SPECIFIED ABNORMAL IMMUNOLOGICAL FINDINGS IN SERUM: ICD-10-CM

## 2022-05-04 DIAGNOSIS — M47.812 SPONDYLOSIS W/OUT MYELOPATHY OR RADICULOPATHY, CERVICAL REGION: ICD-10-CM

## 2022-05-04 DIAGNOSIS — R70.0 ELEVATED ERYTHROCYTE SEDIMENTATION RATE: ICD-10-CM

## 2022-05-04 LAB
ANA PAT FLD IF-IMP: ABNORMAL
ANA SER IF-ACNC: ABNORMAL
C3 SERPL-MCNC: 156 MG/DL
C4 SERPL-MCNC: 36 MG/DL
CREAT SPEC-SCNC: 132 MG/DL
CREAT/PROT UR: 0.1 RATIO
DSDNA AB SER-ACNC: <12 IU/ML
PROT UR-MCNC: 11 MG/DL

## 2022-05-04 PROCEDURE — 99214 OFFICE O/P EST MOD 30 MIN: CPT

## 2022-05-04 RX ORDER — ERGOCALCIFEROL 1.25 MG/1
1.25 MG CAPSULE, LIQUID FILLED ORAL
Qty: 4 | Refills: 1 | Status: DISCONTINUED | COMMUNITY
Start: 2021-10-04 | End: 2022-05-04

## 2022-05-04 RX ORDER — HYDROXYCHLOROQUINE SULFATE 200 MG/1
200 TABLET, FILM COATED ORAL
Qty: 60 | Refills: 3 | Status: ACTIVE | COMMUNITY
Start: 2021-04-28 | End: 1900-01-01

## 2022-05-04 NOTE — ASSESSMENT
[FreeTextEntry1] : 36-year-old female, here for follow up w/ +RF= 29 w/ +TRAN 1:1280 speckled; 1:640 homo;1:80 cytoplasmic w/ reports of joint aches in the b/l hands/PIPs w/ morning stiffness of 2-3 hours daily w/ raised ESR w/ Vectra =41 w/ moderate disease activity consistent w/ RA.\par -reports improved pain in the b/l hands on HCQ without synovitis now \par -reviewed labs 4/28/22 w/ normal CRP; mildly raised decreasing ESR= 22 (from 26 from 27); +TRAN 1:2560 hmogenous; DS-DNA neg; C3/C4 WNL; urine prot/creat ratio=0.1\par -refill Plaquenil 200mg PO BID w/ G6PD normal w/ pt agreeable and prescription sent as below\par -saw Optho, Dr. Parra w/ [facility] 10/5/21 w/ eye exam normal on HCQ to f/u in 1 yr \par -labs as below to monitor for f/u \par -defer MTX in child bearing age female \par -check TPMT enzyme for Imuran if uncontrolled in the future \par -xray of b/l hands 3/29/2021 normal\par -xray b/l knees 3/29/2021 normal \par -xray LS spine 3/29/2021 normal; SI normal \par -Advil PRN w/ food helps w/ pain \par \par +TRAN 1:1280 speckled: repeat TRAN 1:2560 homogenous; likely in the setting of RA\par -Clinically without much symptoms of lupus/sjogren syndrome at this time and educated on symptoms to monitor for in detail if she evolves.\par -lab as below & disease activity markers normal 4/28/22 w/ +TRAN 1:2560 homogenous; DS-DNA normal; C3/C4 WNL; urine prot/creat ratio=0.1\par -thyroid abs negative \par \par Cervicalgia: intermittent; not much today \par -MRI c-spine 6/17/2021 minimal cervical spondylosis \par -xray c-spine 3/29/2021 w/ retrolisthesis w/ degree of dynamic instability; straightened lordosis\par \par -educated on symptoms to monitor for in detail and alert us if any concerns.\par -knows to stay up to date on health maintenance w/ PCP\par -moving to [place] and encouraged to see Rheum there; or f/u in 3-4 mo w/ labs if here or sooner as needed \par \par

## 2022-05-04 NOTE — HISTORY OF PRESENT ILLNESS
[FreeTextEntry1] : 36-year-old female, here for follow up w/ +RF= 29 w/ +TRAN 1:640 homo; 1:160 nuclear, speckled, 1:80 cytoplasmic w/ reports of joint aches in the b/l hands/PIPs w/ morning stiffness of 2-3 hours daily w/ raised ESR w/ concern for RA.\par Today she states moving to [place]. \par Patient states her joints aches today on the HCQ are much improved. States she still has to saw OpthoDr. Parra 10/2021 w/ eye exam normal on HCQ and told to f/u in 1 yr. States no swelling or warmth of the joints today. \par States she notes intermittent stiffness in the hands around the PIPs for about 10 mins.\par States not much neck pain lately. \par Denies any pain or swelling in her ankles or her toes at this time.\par Denies any fever/chills, no rashes, no nasal or oral ulcers, no dry eyes, no dry mouth, no raynaud's, no infectious diarrhea or  symptoms at this time. States she has hx of UTI in the past and now now.\par Denies any hx of blood clots, no stroke, no pregnancies, no miscarriages. \par \par \par \par

## 2022-08-10 ENCOUNTER — APPOINTMENT (OUTPATIENT)
Dept: RHEUMATOLOGY | Facility: CLINIC | Age: 37
End: 2022-08-10

## 2023-05-09 ENCOUNTER — TRANSCRIPTION ENCOUNTER (OUTPATIENT)
Age: 38
End: 2023-05-09